# Patient Record
Sex: FEMALE | Race: WHITE | NOT HISPANIC OR LATINO | Employment: FULL TIME | URBAN - METROPOLITAN AREA
[De-identification: names, ages, dates, MRNs, and addresses within clinical notes are randomized per-mention and may not be internally consistent; named-entity substitution may affect disease eponyms.]

---

## 2018-03-16 ENCOUNTER — OFFICE VISIT (OUTPATIENT)
Dept: PRIMARY CARE CLINIC | Facility: CLINIC | Age: 48
End: 2018-03-16
Payer: COMMERCIAL

## 2018-03-16 VITALS
HEART RATE: 73 BPM | OXYGEN SATURATION: 100 % | BODY MASS INDEX: 26.47 KG/M2 | TEMPERATURE: 98 F | HEIGHT: 71 IN | SYSTOLIC BLOOD PRESSURE: 120 MMHG | RESPIRATION RATE: 12 BRPM | WEIGHT: 189.06 LBS | DIASTOLIC BLOOD PRESSURE: 65 MMHG

## 2018-03-16 DIAGNOSIS — N30.00 ACUTE CYSTITIS WITHOUT HEMATURIA: Primary | ICD-10-CM

## 2018-03-16 DIAGNOSIS — M54.50 ACUTE LOW BACK PAIN WITHOUT SCIATICA, UNSPECIFIED BACK PAIN LATERALITY: ICD-10-CM

## 2018-03-16 DIAGNOSIS — R30.0 DYSURIA: ICD-10-CM

## 2018-03-16 DIAGNOSIS — D68.51 FACTOR 5 LEIDEN MUTATION, HETEROZYGOUS: ICD-10-CM

## 2018-03-16 DIAGNOSIS — Z79.01 ADEQUATE ANTICOAGULATION ON ANTICOAGULANT THERAPY: ICD-10-CM

## 2018-03-16 PROBLEM — E66.9 OBESITY WITH BODY MASS INDEX OF 30.0-39.9: Status: ACTIVE | Noted: 2018-03-16

## 2018-03-16 LAB
BILIRUB SERPL-MCNC: ABNORMAL MG/DL
BLOOD, POC UA: ABNORMAL
GLUCOSE UR QL STRIP: NORMAL
KETONES UR QL STRIP: ABNORMAL
LEUKOCYTE ESTERASE URINE, POC: ABNORMAL
NITRITE, POC UA: ABNORMAL
PH, POC UA: 6
PROTEIN, POC: ABNORMAL
SPECIFIC GRAVITY, POC UA: 1
UROBILINOGEN, POC UA: NORMAL

## 2018-03-16 PROCEDURE — 99202 OFFICE O/P NEW SF 15 MIN: CPT | Mod: 25,S$GLB,, | Performed by: NURSE PRACTITIONER

## 2018-03-16 PROCEDURE — 81000 URINALYSIS NONAUTO W/SCOPE: CPT | Mod: S$GLB,,, | Performed by: NURSE PRACTITIONER

## 2018-03-16 PROCEDURE — 99999 PR PBB SHADOW E&M-NEW PATIENT-LVL IV: CPT | Mod: PBBFAC,,, | Performed by: NURSE PRACTITIONER

## 2018-03-16 RX ORDER — WARFARIN SODIUM 5 MG/1
5 TABLET ORAL DAILY
Refills: 11 | COMMUNITY
Start: 2018-02-15

## 2018-03-16 RX ORDER — NITROFURANTOIN 25; 75 MG/1; MG/1
100 CAPSULE ORAL 2 TIMES DAILY
Qty: 14 CAPSULE | Refills: 0 | Status: SHIPPED | OUTPATIENT
Start: 2018-03-16 | End: 2018-03-23

## 2018-03-16 NOTE — PROGRESS NOTES
Subjective:       Patient ID: Veronika Springer is a 48 y.o. female.    Chief Complaint: Back Pain (low back pain)    Back Pain   This is a new problem. The current episode started in the past 7 days. The problem occurs constantly. The problem has been waxing and waning since onset. The pain is present in the costovertebral angle. The quality of the pain is described as aching. The pain does not radiate. The pain is at a severity of 6/10. The pain is mild. The pain is the same all the time. The symptoms are aggravated by position. Stiffness is present: none. Associated symptoms include dysuria. Pertinent negatives include no abdominal pain, bladder incontinence, chest pain, fever, headaches, leg pain, numbness, paresis, paresthesias, pelvic pain, perianal numbness, tingling, weakness or weight loss. Risk factors include obesity. She has tried analgesics (OTC Azo ) for the symptoms. The treatment provided mild relief.   Dysuria    This is a new problem. The current episode started in the past 7 days. The problem occurs intermittently. The problem has been waxing and waning. The quality of the pain is described as burning. The pain is at a severity of 3/10. The pain is mild. There has been no fever. She is sexually active. There is no history of pyelonephritis. Associated symptoms include chills, flank pain and frequency. Pertinent negatives include no behavior changes, discharge, hematuria, hesitancy, nausea, possible pregnancy, sweats, urgency, vomiting, weight loss, bubble bath use, constipation, rash or withholding. She has tried acetaminophen and increased fluids for the symptoms. The treatment provided mild relief. Her past medical history is significant for recurrent UTIs. There is no history of catheterization, diabetes insipidus, diabetes mellitus, genitourinary reflux, hypertension, kidney stones, a single kidney, STD, urinary stasis or a urological procedure. UTI in January 2018. Previously on Cipro.       Review  of Systems   Constitutional: Positive for chills. Negative for activity change, diaphoresis, fatigue, fever and weight loss.   Respiratory: Negative for cough and shortness of breath.    Cardiovascular: Negative for chest pain and leg swelling.   Gastrointestinal: Negative for abdominal pain, constipation, nausea and vomiting.   Genitourinary: Positive for dysuria, flank pain and frequency. Negative for bladder incontinence, decreased urine volume, difficulty urinating, hematuria, hesitancy, pelvic pain, urgency, vaginal discharge and vaginal pain.   Musculoskeletal: Positive for back pain.   Skin: Negative for rash.   Neurological: Negative for tingling, weakness, numbness, headaches and paresthesias.   All other systems reviewed and are negative.      Objective:      Physical Exam   Constitutional: She is oriented to person, place, and time. She appears well-developed and well-nourished. No distress.   HENT:   Head: Normocephalic.   Neck: Normal range of motion.   Cardiovascular: Normal rate, regular rhythm, normal heart sounds and intact distal pulses.    Pulmonary/Chest: Effort normal and breath sounds normal.   Abdominal: Soft. Bowel sounds are normal. She exhibits no distension.   C/o mild bilateral CVA tenderness    Musculoskeletal: Normal range of motion. She exhibits no edema.   Neurological: She is alert and oriented to person, place, and time.   Skin: Skin is warm. Capillary refill takes less than 2 seconds. She is not diaphoretic. No pallor.   Psychiatric: She has a normal mood and affect. Her behavior is normal.   Nursing note and vitals reviewed.      Assessment:       1. Acute cystitis without hematuria    2. Dysuria    3. Acute low back pain without sciatica, unspecified back pain laterality    4. Adequate anticoagulation on anticoagulant therapy    5. Factor 5 Leiden mutation, heterozygous        Plan:       Acute low back pain without sciatica, unspecified back pain laterality  -     POCT  Urinalysis  UA with Leukos, protein, trace blood.  Will treat with Macrobid.  Patient is on anticoagulation therapy and reports she is traveling back home to [place] today.  She does have an appointment scheduled with the coumadin clinic in [place] on Monday.  After further discussion patient verbalizes above average  knowledge base regarding anticoagulation therapy.  Increase oral fluids and rest.   Dysuria  -     POCT Urinalysis  -     nitrofurantoin, macrocrystal-monohydrate, (MACROBID) 100 MG capsule; Take 1 capsule (100 mg total) by mouth 2 (two) times daily.  Dispense: 14 capsule; Refill: 0  Plan as noted.   Adequate anticoagulation on anticoagulant therapy  Bleeding precautions reinforced to the patient.  She is to f/u with her anticoagulation clinic on Monday.   Factor 5 Leiden mutation, heterozygous  Plan as noted.   Acute cystitis without hematuria  -     POCT Urinalysis  -     nitrofurantoin, macrocrystal-monohydrate, (MACROBID) 100 MG capsule; Take 1 capsule (100 mg total) by mouth 2 (two) times daily.  Dispense: 14 capsule; Refill: 0  Plan as noted.         I have reviewed the patient's past medical/surgical and social histories and updated as appropriate. Medications were reviewed and discussed as appropriate including side effects and risks versus benefit. Plan of care was reviewed and agreed upon with the patient.  An opportunity to ask questions was provided and explanation given. Patient verbalized understanding on all information reviewed and discussed.  The patient will follow up in [place] on Monday. If symptoms worsen patient may call for ASAP appointment or report to the emergency department for further evaluation.

## 2018-03-16 NOTE — PATIENT INSTRUCTIONS
Nitrofurantoin tablets or capsules  What is this medicine?  NITROFURANTOIN (sobeida hernandez) is an antibiotic. It is used to treat urinary tract infections.  How should I use this medicine?  Take this medicine by mouth with a glass of water. Follow the directions on the prescription label. Take this medicine with food or milk. Take your doses at regular intervals. Do not take your medicine more often than directed. Do not stop taking except on your doctor's advice.  Talk to your pediatrician regarding the use of this medicine in children. While this drug may be prescribed for selected conditions, precautions do apply.  What side effects may I notice from receiving this medicine?  Side effects that you should report to your doctor or health care professional as soon as possible:  · allergic reactions like skin rash or hives, swelling of the face, lips, or tongue  · chest pain  · cough  · difficulty breathing  · dizziness, drowsiness  · fever or infection  · joint aches or pains  · pale or blue-tinted skin  · redness, blistering, peeling or loosening of the skin, including inside the mouth  · tingling, burning, pain, or numbness in hands or feet  · unusual bleeding or bruising  · unusually weak or tired  · yellowing of eyes or skin  Side effects that usually do not require medical attention (report to your doctor or health care professional if they continue or are bothersome):  · dark urine  · diarrhea  · headache  · loss of appetite  · nausea or vomiting  · temporary hair loss  What may interact with this medicine?  · antacids containing magnesium trisilicate  · probenecid  · quinolone antibiotics like ciprofloxacin, lomefloxacin, norfloxacin and ofloxacin  · sulfinpyrazone  What if I miss a dose?  If you miss a dose, take it as soon as you can. If it is almost time for your next dose, take only that dose. Do not take double or extra doses.  Where should I keep my medicine?  Keep out of the reach of  children.  Store at room temperature between 15 and 30 degrees C (59 and 86 degrees F). Protect from light. Throw away any unused medicine after the expiration date.  What should I tell my health care provider before I take this medicine?  They need to know if you have any of these conditions:  · anemia  · diabetes  · glucose-6-phosphate dehydrogenase deficiency  · kidney disease  · liver disease  · lung disease  · other chronic illness  · an unusual or allergic reaction to nitrofurantoin, other antibiotics, other medicines, foods, dyes or preservatives  · pregnant or trying to get pregnant  · breast-feeding  What should I watch for while using this medicine?  Tell your doctor or health care professional if your symptoms do not improve or if you get new symptoms. Drink several glasses of water a day. If you are taking this medicine for a long time, visit your doctor for regular checks on your progress.  If you are diabetic, you may get a false positive result for sugar in your urine with certain brands of urine tests. Check with your doctor.  NOTE:This sheet is a summary. It may not cover all possible information. If you have questions about this medicine, talk to your doctor, pharmacist, or health care provider. Copyright© 2017 [employer]